# Patient Record
Sex: MALE | Race: OTHER
[De-identification: names, ages, dates, MRNs, and addresses within clinical notes are randomized per-mention and may not be internally consistent; named-entity substitution may affect disease eponyms.]

---

## 2017-03-31 ENCOUNTER — HOSPITAL ENCOUNTER (EMERGENCY)
Dept: HOSPITAL 74 - JER | Age: 37
Discharge: HOME | End: 2017-03-31
Payer: SELF-PAY

## 2017-03-31 VITALS — BODY MASS INDEX: 29 KG/M2

## 2017-03-31 VITALS — SYSTOLIC BLOOD PRESSURE: 138 MMHG | DIASTOLIC BLOOD PRESSURE: 86 MMHG | HEART RATE: 90 BPM | TEMPERATURE: 98 F
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
## 2017-03-31 DIAGNOSIS — S09.90XA: ICD-10-CM
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
## 2017-03-31 DIAGNOSIS — S01.81XA: Primary | ICD-10-CM
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
## 2017-03-31 PROCEDURE — 0HQ1XZZ REPAIR FACE SKIN, EXTERNAL APPROACH: ICD-10-PCS

## 2017-03-31 NOTE — PDOC
History of Present Illness





- General


History Source: Patient, Family


Exam Limitations: No Limitations, Intoxication





- History of Present Illness


Initial Comments: 





03/31/17 03:55


The patient is a 37 year old male with no significant past medical history who 

presents to the ED with head laceration s/p fall prior to arrival. Patient 

admits to drinking and as he was attempting to reach for another beer, he fell 

out of bed and sustained a laceration over the left eye brow on the forehead. 

Questionable LOC. Denies headache, dizziness, or changes in vision. Patient is 

accompanied by family.  








<Bianka Perez - Last Filed: 03/31/17 03:55>





- General


History Source: Patient





<Reynaldo Espana - Last Filed: 03/31/17 04:56>





- General


Stated Complaint: INTOX/FALL


Time Seen by Provider: 03/31/17 03:28





Past History





<Bianka Perez - Last Filed: 03/31/17 03:55>





<Reynaldo Espana - Last Filed: 03/31/17 04:56>





- Past Medical History


Allergies/Adverse Reactions: 


 Allergies











Allergy/AdvReac Type Severity Reaction Status Date / Time


 


No Known Allergies Allergy   Verified 03/31/17 03:44











Home Medications: 


Ambulatory Orders





NK [No Known Home Medication]  03/31/17 











**Review of Systems





- Review of Systems


Able to Perform ROS?: Yes


Comments:: 





03/31/17 03:55


CONSTITUTIONAL:


Absent: fever, no chills, no fatigue


EYES:


Absent: visual changes


ENT:


Absent: ear pain, no sore throat


CARDIOVASCULAR:


Absent: chest pain, no palpitations


RESPIRATORY:


Absent: cough, no SOB


GI:


Absent: abdominal pain, no nausea, no vomiting, no constipation, no diarrhea


GENITOURINARY:


Absent: dysuria, no frequency, no hematuria


MUSCULOSKELETAL:


Absent: back pain, no arthralgia, no myalgia


SKIN:


+laceration over the left eye brow on the forehead Absent: rash


NEURO:


Absent: headache








<Bianka Perez - Last Filed: 03/31/17 03:55>





*Physical Exam





- Vital Signs


 Last Vital Signs











Temp Pulse Resp BP Pulse Ox


 


 98.0 F   90   20   138/86   98 


 


 03/31/17 03:44  03/31/17 03:44  03/31/17 03:44  03/31/17 03:44  03/31/17 03:44














- Physical Exam


Comments: 





03/31/17 03:55


GENERAL: 


Well-appearing, well-nourished. No apparent distress.


HEENT: 


Normocephalic. 3cm laceration over the left eyebrow on the forehead. No racoon 

or yeung signs. PERRL, EOM intact. No hemotympanum. 


CARDIOVASCULAR: 


Normal S1, S2. Regular rate and rhythm.


PULMONARY: 


Clear to auscultation bilaterally.


ABDOMEN: 


Soft, non-distended, non-tender. 


EXTREMITIES: 


Normal ROM in all four extremities. No gross deformities.


SKIN: 


Warm, dry.  No rash


NEUROLOGICAL: 


No focal neurological deficits.








<Bianka Perez - Last Filed: 03/31/17 03:55>





Procedures





- Laceration/Wound Repair


  ** Face


Wound Length: 2.6 to 5.0 cm


Wound Explored: clean


Wound's Depth, Shape: superficial, linear


Irrigated w/ Saline: Yes


Betadine Prep: Yes


Anesthesia: 1% Lidocaine


Amount of Anesthetic (ccs): 5


Wound Debrided: minimal


Wound Repaired With: Sutures


Suture Size/Type: 5:0


Number of Sutures: 8


Layer Closure: No





<Reynaldo Espana - Last Filed: 03/31/17 04:56>





Medical Decision Making





- Medical Decision Making





03/31/17 04:50


Dr. Espana: The scribe's documentation has been prepared under my direction 

and personally reviewed by me in its entirery. I confirm that the note above 

accurately reflects all work, treatment, procedures, and medical decision 

making performed by me.








Wound closed with  8 5.0 sutures.  Head ct scan is negative for any 

intracranial pathology.  





<Reynaldo Espana - Last Filed: 03/31/17 04:56>





*DC/Admit/Observation/Transfer





- Attestations


Scribe Attestion: 





03/31/17 03:56





Documentation prepared by Bianka Perez, acting as medical scribe for Reynaldo Espana MD





<Bianka Perez - Last Filed: 03/31/17 03:55>





- Discharge Dispostion


Admit: No





<Reynaldo Espana - Last Filed: 03/31/17 04:56>


Diagnosis at time of Disposition: 


 Laceration





Closed head injury


Qualifiers:


 Encounter type: initial encounter Qualified Code(s): S09.90XA - Unspecified 

injury of head, initial encounter





- Discharge Dispostion


Disposition: HOME


Condition at time of disposition: Stable





- Patient Instructions


Printed Discharge Instructions:  DI for Closed Head Injury, DI for Suture 

Removal, DI for Laceration Repair -- Simple


Additional Instructions: 


Keep wounclean and dry.  WAsh with soap and water... REturn in 5 days for 

suture removal


Print Language: Japanese





- Post Discharge Activity


Work/School Note:  Back to Work

## 2017-04-05 ENCOUNTER — HOSPITAL ENCOUNTER (EMERGENCY)
Dept: HOSPITAL 74 - JERFT | Age: 37
Discharge: HOME | End: 2017-04-05
Payer: SELF-PAY

## 2017-04-05 VITALS — SYSTOLIC BLOOD PRESSURE: 143 MMHG | HEART RATE: 79 BPM | TEMPERATURE: 98.4 F | DIASTOLIC BLOOD PRESSURE: 83 MMHG

## 2017-04-05 VITALS — BODY MASS INDEX: 24.2 KG/M2

## 2017-04-05 DIAGNOSIS — Z48.02: Primary | ICD-10-CM

## 2017-04-05 NOTE — PDOC
Suture Removal/Wound Check HPI





- History of Present Illness


Chief Complaint: Suture/Staple Removal(Here)


Stated Complaint: STAPLE/SUTURE REMOVAL


Time Seen by Provider: 04/05/17 09:30


History Source: Yes: Patient


Exam Limitations: Yes: No Limitations


Treated at: [facility] ED


Date of Last ED visit: 03/31/17





- Previous ED Treatment


Type of procedure performed on last visit: Yes: Laceration Repair


Antibiotics Prescribed: No





- Onset of Previous Treatment


Date of Occurence: 03/31/17





Past History





- Past Medical History


Allergies/Adverse Reactions: 


Allergies





No Known Allergies Allergy (Verified 04/05/17 09:20)


 








Home Medications: 


Ambulatory Orders





NK [No Known Home Medication]  03/31/17 








General: Yes: no pertinent history





- Social History


Smoking Status: Never smoked





Suture Removal/Wound Check PE





- Physical Exam


Laceration/Wound Check Symptoms: reports: None


Current Severity Level: None


Maximum Severity Level: None


Pain Localization: None


Location of Laceration/Wound: left: Face (left forehead )


Pain Radiation: None


Comments: 





04/05/17 10:18








Seen  here on 03/31/2017 due to falling when he was intoxicated patient 

received 8 interrupted sutures to his left forehead laceration. Patient did not 

get T dap vaccine patient does not know the date of his last tetanus.. He 

offers no complaints'








*Review of Systems





- Review of Systems


Able to Perform ROS?: Yes


Constitutional: No: Symptoms Reported


HEENTM: No: Symptoms Reported


Respiratory: No: Symptoms reported


Cardiac (ROS): No: Symptoms Reported


ABD/GI: No: Symptoms Reported


Integumentary: Yes: Other (8 interrupted sutures left forehead healed well no 

signs of infection )


Neurological: No: Symptoms reported





Procedures





- Consent


Consent obtained: From Patient





- Additional Procedures


Progress: 





04/05/17 10:20








cleansed skin with betadine around left forehead laceration using says her and 

hemostat removed 8 interrupted sutures without complication wound edges were 

well approximated some scabbing noted no bleeding noted no signs of infection. 

Cleanse wound with normal saline 0.9% dried area and applied a tiny amount of 

bacitracin





Medical Decision Making





- Medical Decision Making





04/05/17 10:21











Removal left forehead 8 interrupted sutures patient was seen here on 03/31/2017 

due to a fall while intoxicated received 8 interrupted sutures patient offers 

no complaints presently. Patient does not remember the date of his last tetanus.














Plan:


8 interrupted sutures removed without complication to left forehead laceration 

no signs of infection


TDAP 05 ml IM now 





*DC/Admit/Observation/Transfer


Diagnosis at time of Disposition: 


 Visit for suture removal





- Discharge Dispostion


Disposition: HOME


Condition at time of disposition: Stable





- Patient Instructions


Additional Instructions: 











You need to cleanse laceration area on left forehead with antibacterial soap 

and water pat dry and apply a tiny amount of bacitracin ointment until scab 

falls off











Return to emergency room if any signs of infection around laceration redness 

discharge or tenderness of area














Patient voiced understanding of discharge instructions and all questions were 

answered











Your tetanus, diphtheria and pertussis vaccine was updated.

## 2018-04-16 ENCOUNTER — HOSPITAL ENCOUNTER (EMERGENCY)
Dept: HOSPITAL 74 - JER | Age: 38
Discharge: HOME | End: 2018-04-16
Payer: SELF-PAY

## 2018-04-16 VITALS — SYSTOLIC BLOOD PRESSURE: 118 MMHG | HEART RATE: 90 BPM | DIASTOLIC BLOOD PRESSURE: 67 MMHG

## 2018-04-16 VITALS — BODY MASS INDEX: 27.4 KG/M2

## 2018-04-16 VITALS — TEMPERATURE: 97 F

## 2018-04-16 DIAGNOSIS — S01.01XA: Primary | ICD-10-CM
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
## 2018-04-16 DIAGNOSIS — F10.10: ICD-10-CM
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
## 2018-04-16 PROCEDURE — 0HQ0XZZ REPAIR SCALP SKIN, EXTERNAL APPROACH: ICD-10-PCS | Performed by: EMERGENCY MEDICINE

## 2018-04-16 NOTE — PDOC
Attending Attestation





- HPI


HPI: 





04/16/18 15:08


The patient is a 38 year old male with a significant PMH of alcohol abuse who 

presents to the emergency department with a head laceration s/p fall. The 

patient reports walking down the stairs when he fell and hit his head. He 

reports a laceration to the top of his head which he notes is actively 

bleeding. The patient notes drinking about 7 beers today after being sober for 

an extended period of time. The patient denies LOC. He denies chest pain or 

shortness of breath. He denies weakness or numbness.





Allergies: NKA


PCP: None reported. 








<Thien Lane - Last Filed: 04/16/18 15:08>





- Resident


Resident Name: Colin Fajardo





- ED Attending Attestation


I have performed the following: I have examined & evaluated the patient, The 

case was reviewed & discussed with the resident, I agree w/resident's findings 

& plan, Exceptions are as noted





- Physicial Exam


PE: 





04/16/18 15:53








Vitals: Triage Vital signs reviewed  


General Appearance:  no acute distress, well nourished well developed, 


Head: Traumatic, Laceration 6cm to top of head


Eyes: Pupils equal reactive round, extraocular movement intact


Neck:  Supple;No Nucal rigidity


Chest Wall: Nontender


Cardiac: Regular rate and rhythym, no murmurs, no rubs, no gallops, 


Lungs: Clear to auscultation bilateral, good air movement bilaterally,


Abdomen:  Soft, non distended, normal bowel sounds, non tender to palpation


Extremities: Full range of motion to all extremities, no cyanosis, clubbing, or 

edema


Skin:  Warm and dry, no rashes or lesions, no rash, no petechiae


Neuro: AOX3; Cranial Nerves 2-12 grossly intact, Strength intact to all 

extremities, Sensation intact to all extremities,gait normal


Psych:  normal mood, normal affect








- Medical Decision Making





04/16/18 15:47





38 years old with history of alcohol abuse presents to the ED status post fall 

and head laceration





No acute findings on head CT laceration stapled by resident with good 

approximation





Patient no longer clinically intoxicated no slurred speech no sustained 

nystagmus steady gait able to family comfortably around the emergency department





Findings, the need for follow-up, strict return instructions discussed with 

patient.








<Timo Yates - Last Filed: 04/16/18 15:54>

## 2018-04-16 NOTE — PDOC
History of Present Illness





- General


Chief Complaint: Injury


Stated Complaint: FALL


Time Seen by Provider: 04/16/18 13:16


History Source: Patient





- History of Present Illness


Initial Comments: 





04/16/18 15:12


38M with no pmh presents to the ED after falling while inebriated and knocking 

his head on a table. 


Patient's niece says he drank all night long until morning and fell backward 

hurting his head, losing consciousness for about 2 minutes, attempting in vain 

to explain his behavior using a nonsensical language consisting of various deep 

guttural phonemes. He progressively regained a somewhat  appropriate mental 

status after a few minutes. 


Patient is a chronic abuser of alcohol. 














Past History





- Past Medical History


Allergies/Adverse Reactions: 


 Allergies











Allergy/AdvReac Type Severity Reaction Status Date / Time


 


No Known Allergies Allergy   Verified 04/16/18 13:18











Home Medications: 


Ambulatory Orders





NK [No Known Home Medication]  03/31/17 








COPD: No


Other medical history: etoh abuse





- Suicide/Smoking/Psychosocial Hx


Smoking History: Never smoked


Have you smoked in the past 12 months: No


Information on smoking cessation initiated: No


Hx Alcohol Use: No


Drug/Substance Use Hx: No


Substance Use Type: Alcohol





**Review of Systems





- Review of Systems


Able to Perform ROS?: No (intoxicated)





*Physical Exam





- Vital Signs


 Last Vital Signs











Temp Pulse Resp BP Pulse Ox


 


 97 F L  96 H  18   123/69   97 


 


 04/16/18 13:18  04/16/18 13:18  04/16/18 13:18  04/16/18 13:18  04/16/18 13:18














- Physical Exam


General Appearance: Yes: Nourished, Appropriately Dressed, Intoxicated, Other (

blood on left hand, dressing over head).  No: Apparent Distress


HEENT: positive: ZENIA, Normal ENT Inspection, Other (6cm linear laceration over 

top of scalp. )


Neck: negative: Tender


Respiratory/Chest: positive: Lungs Clear, Normal Breath Sounds.  negative: 

Chest Tender, Respiratory Distress


Cardiovascular: positive: Regular Rhythm, S1, S2, Tachycardia


Gastrointestinal/Abdominal: positive: Normal Bowel Sounds





Procedures





- Laceration/Wound Repair


  ** Upper Head


Wound Length: 5.0 to 7.5 cm


Wound Explored: clean


Wound's Depth, Shape: superficial, linear


Irrigated w/ Saline: Yes


Wound Repaired With: Staples


Number of Sutures: 7





ED Treatment Course





- RADIOLOGY


Radiology Studies Ordered: 














 Category Date Time Status


 


 HEAD CT WITHOUT CONTRAST [CT] Stat CT Scan  04/16/18 13:21 Ordered














Medical Decision Making





- Medical Decision Making





04/16/18 15:38


38m hx o etoh abuse presents with laceration of top of scalp. 


Ct head negative , scap stapled. 


Will reevaluate the patient for sobriety and d/c





*DC/Admit/Observation/Transfer


Diagnosis at time of Disposition: 


 Scalp laceration, Alcohol intoxication








- Discharge Dispostion


Disposition: HOME


Condition at time of disposition: Improved


Admit: No





- Referrals





- Patient Instructions


Printed Discharge Instructions:  Alcohol Use Disorder, DI for Alcohol Abuse


Additional Instructions: 


Come back to the Emergency Department or any primary care provider for staple 

removal within 7 to 10 days. 


Come back to the Emergency Department if you experience any concerning, 

worsening or persisting symptoms. 





- Post Discharge Activity

## 2018-04-23 ENCOUNTER — HOSPITAL ENCOUNTER (EMERGENCY)
Dept: HOSPITAL 74 - JERFT | Age: 38
Discharge: HOME | End: 2018-04-23
Payer: SELF-PAY

## 2018-04-23 VITALS — DIASTOLIC BLOOD PRESSURE: 79 MMHG | HEART RATE: 83 BPM | SYSTOLIC BLOOD PRESSURE: 132 MMHG | TEMPERATURE: 98.1 F

## 2018-04-23 VITALS — BODY MASS INDEX: 26.6 KG/M2

## 2018-04-23 DIAGNOSIS — Z48.02: Primary | ICD-10-CM

## 2018-04-23 NOTE — PDOC
Suture Removal/Wound Check HPI





- History of Present Illness


Chief Complaint: Suture/Staple Removal(Here)


Stated Complaint: SUTURE REMOVAL


Time Seen by Provider: 04/23/18 10:35


History Source: Yes: Patient


Exam Limitations: Yes: No Limitations


Treated at: [facility] ED


Date of Last ED visit: 03/31/17





- Previous ED Treatment


Type of procedure performed on last visit: Yes: Laceration Repair


Tetanus Immunization: Yes: Up to Date, Last Tetanus <5 years ago


Antibiotics Prescribed: No





Past History





- Travel


Traveled outside of the country in the last 30 days: No


Close contact w/someone who was outside of country & ill: No





- Past Medical History


Allergies/Adverse Reactions: 


 Allergies











Allergy/AdvReac Type Severity Reaction Status Date / Time


 


No Known Allergies Allergy   Verified 04/23/18 10:08











Home Medications: 


Ambulatory Orders





NK [No Known Home Medication]  03/31/17 








COPD: No





- Suicide/Smoking/Psychosocial Hx


Smoking History: Never smoked


Have you smoked in the past 12 months: No


Information on smoking cessation initiated: No


Hx Alcohol Use: No


Drug/Substance Use Hx: No


Substance Use Type: Alcohol





Suture Removal/Wound Check PE





- Physical Exam


Laceration/Wound Check Symptoms: reports: Improved.  denies: Pain, Fever, Chills

, Redness, Discharge, Bleeding


Current Severity Level: None


Maximum Severity Level: None


Pain Localization: None


Location of Laceration/Wound: right: Head (Midline scalp, 7 staples in place 

with mild scabbing over top of staples.)





*Review of Systems





- Review of Systems


Able to Perform ROS?: Yes


Constitutional: No: Chills, Fever, Weakness


Integumentary: Yes: Other (Staples in place to mid scalp.).  No: Bruising, 

Pruritus, Rash


Neurological: No: Headache, Numbness, Tingling





Medical Decision Making





- Medical Decision Making





04/23/18 10:59


Pt. is a  37 y/o M who presents for staple removal of scalp. Wound is clean and 

well approximated. Staples removed and pt tolerated procedure well. Bacitracin 

placed over the site. Return precautions given. Pt. understands all dc 

instructions and all questions were answered.





*DC/Admit/Observation/Transfer


Diagnosis at time of Disposition: 


 Removal of staples








- Discharge Dispostion


Disposition: HOME


Condition at time of disposition: Stable


Admit: No





- Referrals


Referrals: 


Obi Johnston MD [Staff Physician] - 





- Patient Instructions


Printed Discharge Instructions:  DI for Suture Removal


Additional Instructions: 


You had your staples removed today.


Please use bacitracin on the site for the next week.


Avoid soaking the area with water for 1 more week as to what the wound fully 

heal.


Follow-up with her primary care doctor as needed





Return to the emergency department if you develop fevers, drainage from the site

, increased pain, or have any changes in your symptoms.





- Post Discharge Activity


Forms/Work/School Notes:  Back to Work

## 2018-04-30 ENCOUNTER — HOSPITAL ENCOUNTER (EMERGENCY)
Dept: HOSPITAL 74 - JERFT | Age: 38
Discharge: HOME | End: 2018-04-30
Payer: COMMERCIAL

## 2018-04-30 VITALS — HEART RATE: 74 BPM | SYSTOLIC BLOOD PRESSURE: 132 MMHG | TEMPERATURE: 98 F | DIASTOLIC BLOOD PRESSURE: 85 MMHG

## 2018-04-30 VITALS — BODY MASS INDEX: 25 KG/M2

## 2018-04-30 DIAGNOSIS — S05.01XA: Primary | ICD-10-CM

## 2018-04-30 NOTE — PDOC
History of Present Illness





- General


Chief Complaint: Eye Problem


Stated Complaint: RT EYE PAIN


Time Seen by Provider: 04/30/18 10:58


History Source: Patient


Exam Limitations: No Limitations





- History of Present Illness


Initial Comments: 





04/30/18 11:53


Patient is a 38-year-old male with no past medical history presenting emergency 

department today complaining that he feels like he has sand in his right eye. 

Patient states symptoms began approximately 5 days ago. Denies any trauma to 

the eye. 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 

frequently. States that he feels like his vision is blurry. Denies fevers, 

chills, pain with eye movement, floaters, flashing lights, diplopia, nausea, 

vomiting and diarrhea.





Past History





- Travel


Traveled outside of the country in the last 30 days: No


Close contact w/someone who was outside of country & ill: No





- Past Medical History


Allergies/Adverse Reactions: 


 Allergies











Allergy/AdvReac Type Severity Reaction Status Date / Time


 


No Known Allergies Allergy   Verified 04/30/18 10:39











Home Medications: 


Ambulatory Orders





Ofloxacin 0.3% Ophth Soln [Ocuflox -] 1 drop OP Q2H #120 drops 04/30/18 








COPD: No


DVT: No





- Suicide/Smoking/Psychosocial Hx


Smoking History: Never smoked


Have you smoked in the past 12 months: No


Information on smoking cessation initiated: No


Hx Alcohol Use: No


Drug/Substance Use Hx: No


Substance Use Type: Alcohol





**Review of Systems





- Review of Systems


Able to Perform ROS?: Yes


Comments:: 





04/30/18 11:02


CONSTITUTIONAL: 


Absent: fever, chills, diaphoresis, generalized weakness, malaise, loss of 

appetite


HEENT: 


Present: R eye pain, visual changes Absent: rhinorrhea, nasal congestion, 

throat pain, throat swelling, difficulty swallowing, mouth swelling, ear pain


NEUROLOGIC: 


Absent: headache, focal weakness or paresthesias, dizziness, unsteady gait, 

seizure, mental status changes, bladder or bowel incontinence





Is the patient limited English proficient: No





*Physical Exam





- Vital Signs


 Last Vital Signs











Temp Pulse Resp BP Pulse Ox


 


 98.0 F   74   18   132/85   100 


 


 04/30/18 10:39  04/30/18 10:39  04/30/18 10:39  04/30/18 10:39  04/30/18 10:39














- Physical Exam


Comments: 





04/30/18 11:02


GENERAL:


Well developed, well nourished. Awake and alert. No acute distress.


HEENT:


Normocephalic, atraumatic. PERRLA, EOMI. No conjunctival pallor. Sclera are non-

icteric. Moist mucous membranes. Oropharynx is clear. Fluroscein stain shows a 

linear corneal abrasion along the iris. Visual acuity OD 40/20, OS 30/20, OU 30/

20


NECK: 


Supple. Full ROM. No JVD. Carotid pulses 2+ and symmetric, without bruits. No 

thyromegaly. No lymphadenopathy.


SKIN: 


Warm and dry. Normal capillary refill. No rashes. No jaundice. 


NEUROLOGICAL: 


Alert, awake, appropriate. Cranial nerves 2-12 intact. No deficits to light 

touch and temperature in face, upper extremities and lower extremities. No 

motor deficits in the in face, upper extremities and lower extremities. 

Normoreflexic in the upper and lower extremities. Normal speech. Toes are down-

going bilaterally. Gait is normal without ataxia.








Medical Decision Making





- Medical Decision Making





04/30/18 11:59


Patient is a 38-year-old male with no past medical history of present fevers 

department today complaining that he feels like there is something in his right 

eye. Fluorescein stain exam shows a corneal abrasion. We'll treat at this time. 

Patient given ophthalmology follow-up. Return precautions given. Patient 

understands all discharge instructions and all questions were answered.





*DC/Admit/Observation/Transfer


Diagnosis at time of Disposition: 


Corneal abrasion, right


Qualifiers:


 Encounter type: initial encounter Qualified Code(s): S05.01XA - Injury of 

conjunctiva and corneal abrasion without foreign body, right eye, initial 

encounter








- Discharge Dispostion


Disposition: HOME


Condition at time of disposition: Stable


Admit: No





- Prescriptions


Prescriptions: 


Ofloxacin 0.3% Ophth Soln [Ocuflox -] 1 drop OP Q2H #120 drops





- Referrals


Referrals: 


Rafi Chris MD [Staff Physician] - 





- Patient Instructions


Printed Discharge Instructions:  DI for Corneal Abrasion


Additional Instructions: 


You have a corneal abrasion.


Please use the eyedrops every 2 hours in the right eye. Please pick the 

prescription up at the pharmacy.


You may take Motrin as needed for pain.


Please follow up at the [facility] outpatient clinic, 

ophthalmology for further treatment.





Return to the emergency department if you have worsening pain, increased visual 

changes, or have any changes in your symptoms.








Usted tiene aditya abrasin corneal.


Utilice las gotas para los ojos cada 2 horas en el sadie derecho. Elija la receta 

en la farmacia.


Puede halle Motrin cuando sea necesario para el dolor.


Realice un seguimiento en la clnica ambulatoria del [facility]

, oftalmologa para recibir ms tratamiento.





Regrese al servicio de urgencias si tiene un empeoramiento del dolor, un 

aumento en los cambios visuales o cambios en los sntomas.





[facility]


[facility] ([facility]) 


[address] [place] [phone]


Print Language: Sinhala





- Post Discharge Activity


Forms/Work/School Notes:  Back to Work